# Patient Record
Sex: FEMALE | Race: OTHER | NOT HISPANIC OR LATINO
[De-identification: names, ages, dates, MRNs, and addresses within clinical notes are randomized per-mention and may not be internally consistent; named-entity substitution may affect disease eponyms.]

---

## 2019-12-27 PROBLEM — Z00.00 ENCOUNTER FOR PREVENTIVE HEALTH EXAMINATION: Status: ACTIVE | Noted: 2019-12-27

## 2020-01-09 ENCOUNTER — APPOINTMENT (OUTPATIENT)
Dept: ORTHOPEDIC SURGERY | Facility: CLINIC | Age: 70
End: 2020-01-09
Payer: MEDICARE

## 2020-01-09 ENCOUNTER — OUTPATIENT (OUTPATIENT)
Dept: OUTPATIENT SERVICES | Facility: HOSPITAL | Age: 70
LOS: 1 days | End: 2020-01-09
Payer: MEDICARE

## 2020-01-09 VITALS — HEIGHT: 60 IN | WEIGHT: 214 LBS | BODY MASS INDEX: 42.01 KG/M2

## 2020-01-09 PROCEDURE — 73130 X-RAY EXAM OF HAND: CPT | Mod: 26,LT

## 2020-01-09 PROCEDURE — 73130 X-RAY EXAM OF HAND: CPT

## 2020-01-09 PROCEDURE — 99203 OFFICE O/P NEW LOW 30 MIN: CPT

## 2020-01-09 NOTE — HISTORY OF PRESENT ILLNESS
[Worsening] : worsening [NSAIDs] : relieved by nonsteroidal anti-inflammatory drugs [de-identified] : 70yo f c/o left hand/wrist pain x 4 months. Pt. denies any accident or injury to left hand. Pt. is right hand dominant. Pt. c/o pain in left thumb with radiation up forearm. Pt. states she has difficulty with gripping and picking up objects. Pt. has numbness/tingling in fingertips. Pt. had surgery done on 12/18 on right hand for right hand carpal tunnel syndrome at [facility].

## 2020-01-09 NOTE — DISCUSSION/SUMMARY
[de-identified] : 68 yo F with PMH of DM2 presents with L dequervains tenosynovitis\par 1. Pt. was given left thumb spica splint\par 2. Pain control- meloxicam 7.5mg daily \par 3. Recommend PT will give rx at next f/u visit after injection\par 4. F/u with PMD for clearance related to prediabetes for injection\par 5. F/u in 4 weeks at clinic\par \par \par Evaluated by ROXANN German, supervised by Dr. Benoit Mcmullen

## 2020-01-09 NOTE — PHYSICAL EXAM
[NL] : normal and symmetric bilaterally [C5-Bilat] : C5 [C6-Bilat] : C6 [C7-Bilat] : C7 [2+] : left 2+ [T1-Bilat] : T1 [C8-Bilat] : C8 [Wrist Pain Finkelstein's Test (Forcible Ulnar Deviation)] : positive Finkelstein's test [Bryan's Test] : negative Bryan's test [] : negative grind test [Wrist Neurological Dysfunction Phalen's Maneuver] : negative Phalen's test [Tinel's Sign Median Nerve At The Wrist (Carpal Tunnel)] : negative Tinel's sign (carpal tunnel) [de-identified] : LEFT HAND PE SKIN INTACT [de-identified] : Left hand xray\par no fractures no signs of basal joint arthritis, no obvious swelling -+\par

## 2020-02-13 ENCOUNTER — APPOINTMENT (OUTPATIENT)
Dept: ORTHOPEDIC SURGERY | Facility: CLINIC | Age: 70
End: 2020-02-13
Payer: MEDICARE

## 2020-02-13 PROCEDURE — 99212 OFFICE O/P EST SF 10 MIN: CPT | Mod: 25

## 2020-02-13 PROCEDURE — 20610 DRAIN/INJ JOINT/BURSA W/O US: CPT | Mod: LT

## 2020-02-13 NOTE — PROCEDURE
[Injection] : Injection [Tendon Sheath___] : [unfilled] ganglion cyst [Left] : on the left.   [Inflammation] : Inflammation [Risk] : Risk [Patient] : patient [Alternatives] : alternatives [Benefits] : benefits [Ethyl Chloride Spray] : ethyl chloride spray was used as a topical anesthetic [Alcohol] : Alcohol [25] : a 25-gauge [1% Lidocaine___(mL)] : [unfilled] mL of 1% Lidocaine [Triamcinolone 10mg/mL___(mL)] : [unfilled] ~UmL of 10mg/mL triamcinolone [Bandage Applied] : a bandage [Tolerated Well] : The patient tolerated the procedure well [None] : None

## 2020-02-13 NOTE — DISCUSSION/SUMMARY
[de-identified] : 69F w/ L Dequervains tenosynovitis\par \par -Pt failed conservative mgmt of NSAIDs and splinting, reported worsening sx and difficulty w/ ADLs \par -Underwent successful injection of 0.5 cc Lidocaine, 0.5cc Kenalog w/ immediate relief of sx\par -May continue brace PRN, at night as well\par -Follow-up PRN if sx returns

## 2020-02-13 NOTE — HISTORY OF PRESENT ILLNESS
[de-identified] : 68yo f c/o left hand/wrist pain x 5 months. Pt. denies any accident or injury to left hand. Pt. is right hand dominant. Pt. c/o pain in left thumb with radiation up forearm. Pt. states she has difficulty with gripping and picking up objects. Pt. has numbness/tingling in fingertips. Pt. had surgery done on 12/18 on right hand for right hand carpal tunnel syndrome at [facility]. Seen here 1/9 for Dequervains. Given meloxicam and splint. Planned for injection today pending clearance from her PMD due to her diabetes which she has been cleraed for.\par \par She states the symptoms are worsening. No improvement with meloxicam or splint.\par \par Relieving factors include relieved by nonsteroidal anti-inflammatory drugs.

## 2020-02-13 NOTE — PHYSICAL EXAM
[de-identified] : LEFT HAND PE SKIN INTACT \par Motor: Motor strength of the left upper extremities is normal. Elbow Flexion (C5, C6) - normal and symmetric bilaterally. Sensory: Bilateral sensory abnormalities in C5, C6, C7, C8 and T1. Pulses: Radial (left 2+ ). \par Special Tests: \par Left - positive Finkelstein's test, but negative Bryan's test, negative grind test, negative Phalen's test and negative Tinel's sign (carpal tunnel).  [de-identified] : None today

## 2020-03-03 ENCOUNTER — APPOINTMENT (OUTPATIENT)
Dept: NEUROLOGY | Facility: CLINIC | Age: 70
End: 2020-03-03

## 2020-05-12 ENCOUNTER — APPOINTMENT (OUTPATIENT)
Dept: GASTROENTEROLOGY | Facility: CLINIC | Age: 70
End: 2020-05-12

## 2020-11-05 ENCOUNTER — APPOINTMENT (OUTPATIENT)
Dept: NEUROLOGY | Facility: CLINIC | Age: 70
End: 2020-11-05
Payer: MEDICARE

## 2020-11-05 VITALS
OXYGEN SATURATION: 97 % | SYSTOLIC BLOOD PRESSURE: 106 MMHG | TEMPERATURE: 97 F | DIASTOLIC BLOOD PRESSURE: 70 MMHG | HEART RATE: 84 BPM | HEIGHT: 60 IN

## 2020-11-05 DIAGNOSIS — R73.03 PREDIABETES.: ICD-10-CM

## 2020-11-05 DIAGNOSIS — M79.2 NEURALGIA AND NEURITIS, UNSPECIFIED: ICD-10-CM

## 2020-11-05 PROCEDURE — 99204 OFFICE O/P NEW MOD 45 MIN: CPT

## 2020-11-05 PROCEDURE — 99072 ADDL SUPL MATRL&STAF TM PHE: CPT

## 2020-11-05 RX ORDER — GABAPENTIN 100 MG/1
100 CAPSULE ORAL AT BEDTIME
Qty: 30 | Refills: 4 | Status: ACTIVE | COMMUNITY
Start: 2020-11-05 | End: 1900-01-01

## 2020-11-06 PROBLEM — R73.03 PRE-DIABETES: Status: RESOLVED | Noted: 2020-11-06 | Resolved: 2020-11-06

## 2020-11-06 RX ORDER — LEVOTHYROXINE SODIUM 112 UG/1
112 TABLET ORAL DAILY
Refills: 0 | Status: ACTIVE | COMMUNITY

## 2020-11-06 RX ORDER — METFORMIN HYDROCHLORIDE 500 MG/1
500 TABLET, COATED ORAL TWICE DAILY
Refills: 0 | Status: ACTIVE | COMMUNITY

## 2020-11-10 ENCOUNTER — APPOINTMENT (OUTPATIENT)
Dept: ORTHOPEDIC SURGERY | Facility: CLINIC | Age: 70
End: 2020-11-10
Payer: MEDICARE

## 2020-11-10 VITALS — RESPIRATION RATE: 16 BRPM | BODY MASS INDEX: 38.28 KG/M2 | HEIGHT: 60 IN | WEIGHT: 195 LBS

## 2020-11-10 DIAGNOSIS — Z86.39 PERSONAL HISTORY OF OTHER ENDOCRINE, NUTRITIONAL AND METABOLIC DISEASE: ICD-10-CM

## 2020-11-10 PROCEDURE — 99204 OFFICE O/P NEW MOD 45 MIN: CPT

## 2020-11-10 PROCEDURE — 99072 ADDL SUPL MATRL&STAF TM PHE: CPT

## 2020-11-10 PROCEDURE — 73110 X-RAY EXAM OF WRIST: CPT | Mod: LT,RT

## 2020-12-24 ENCOUNTER — APPOINTMENT (OUTPATIENT)
Dept: NEUROLOGY | Facility: CLINIC | Age: 70
End: 2020-12-24
Payer: MEDICARE

## 2020-12-24 VITALS
HEIGHT: 60 IN | WEIGHT: 204 LBS | BODY MASS INDEX: 40.05 KG/M2 | HEART RATE: 62 BPM | DIASTOLIC BLOOD PRESSURE: 81 MMHG | TEMPERATURE: 96.7 F | OXYGEN SATURATION: 97 % | SYSTOLIC BLOOD PRESSURE: 139 MMHG

## 2020-12-24 DIAGNOSIS — R20.0 ANESTHESIA OF SKIN: ICD-10-CM

## 2020-12-24 DIAGNOSIS — M65.4 RADIAL STYLOID TENOSYNOVITIS [DE QUERVAIN]: ICD-10-CM

## 2020-12-24 PROCEDURE — 95913 NRV CNDJ TEST 13/> STUDIES: CPT

## 2020-12-24 PROCEDURE — 99214 OFFICE O/P EST MOD 30 MIN: CPT | Mod: 25

## 2020-12-24 PROCEDURE — 95885 MUSC TST DONE W/NERV TST LIM: CPT

## 2020-12-24 PROCEDURE — 99072 ADDL SUPL MATRL&STAF TM PHE: CPT

## 2020-12-24 NOTE — PHYSICAL EXAM
[FreeTextEntry1] : Gen: appears well, well-nourished, no acute distress\par \par MS: awake, alert, oriented, speech fluent, comprehension intact, good fund of knowledge, recent and remote memory intact, attention intact\par \par CN: PERRL, EOMI, visual fields full, facial strength and sensation intact and symmetric, palate elevation symmetric, tongue midline, no tongue atrophy or fasciculations\par \par Motor: normal bulk and tone, left APB 4+/5 otherwise 5/5 UE symmetric\par \par Sensory: light touch and pinprick diminished in left hand compared to right, largely in median distribution \par \par Reflexes: 1+ symmetric UE b/l \par \par Coordination: no dysmetria \par \par Gait: normal\par \par MSK: tinel's sign absent at wrists and elbows b/l\par tenderness of extensor pollicis longus tendon on left

## 2020-12-24 NOTE — HISTORY OF PRESENT ILLNESS
[FreeTextEntry1] : Referred by Dr. Pittman for carpal tunnel syndrome\par Symptoms present on the left for 3-4 months, worse at night, described as numbness, associated with tingling\par Wearing a splint at night helps \par \par ROS: pain in left lateral wrist; no weakness\par \par Reviewed: \par Prior notes from ortho, neuro\par \par PMH: pre diabetic\par carpal tunnel on right s/p surgery > 1 year ago

## 2020-12-24 NOTE — ASSESSMENT
[FreeTextEntry1] : NCS/EMG showed a moderate left ulnar nerve entrapment at the wrist consistent with carpal tunnel syndrome\par there was no ulnar nerve entrapment on the left\par \par f/u with Dr. Pittman, Dr. Chairez\par continue nocturnal wrist bracing in the meantime\par \par See separate procedure note for full results of study.

## 2021-01-07 ENCOUNTER — APPOINTMENT (OUTPATIENT)
Dept: NEUROLOGY | Facility: CLINIC | Age: 71
End: 2021-01-07
Payer: MEDICARE

## 2021-01-07 VITALS
SYSTOLIC BLOOD PRESSURE: 154 MMHG | OXYGEN SATURATION: 98 % | HEIGHT: 60 IN | DIASTOLIC BLOOD PRESSURE: 84 MMHG | TEMPERATURE: 98.1 F | WEIGHT: 204 LBS | HEART RATE: 58 BPM | BODY MASS INDEX: 40.05 KG/M2

## 2021-01-07 PROCEDURE — 99072 ADDL SUPL MATRL&STAF TM PHE: CPT

## 2021-01-07 PROCEDURE — 99213 OFFICE O/P EST LOW 20 MIN: CPT

## 2021-01-07 RX ORDER — MELOXICAM 7.5 MG/1
7.5 TABLET ORAL DAILY
Qty: 30 | Refills: 0 | Status: DISCONTINUED | COMMUNITY
Start: 2020-01-09 | End: 2021-01-07

## 2021-01-12 ENCOUNTER — APPOINTMENT (OUTPATIENT)
Dept: ORTHOPEDIC SURGERY | Facility: CLINIC | Age: 71
End: 2021-01-12
Payer: MEDICARE

## 2021-01-12 DIAGNOSIS — G56.02 CARPAL TUNNEL SYNDROME, LEFT UPPER LIMB: ICD-10-CM

## 2021-01-12 PROCEDURE — 99214 OFFICE O/P EST MOD 30 MIN: CPT | Mod: 25

## 2021-01-12 PROCEDURE — 20550 NJX 1 TENDON SHEATH/LIGAMENT: CPT | Mod: F2,58

## 2021-01-12 PROCEDURE — 20605 DRAIN/INJ JOINT/BURSA W/O US: CPT | Mod: LT

## 2021-01-12 PROCEDURE — 73120 X-RAY EXAM OF HAND: CPT | Mod: LT

## 2021-01-12 PROCEDURE — 99072 ADDL SUPL MATRL&STAF TM PHE: CPT

## 2021-02-04 ENCOUNTER — APPOINTMENT (OUTPATIENT)
Dept: GASTROENTEROLOGY | Facility: CLINIC | Age: 71
End: 2021-02-04